# Patient Record
Sex: MALE | Race: WHITE
[De-identification: names, ages, dates, MRNs, and addresses within clinical notes are randomized per-mention and may not be internally consistent; named-entity substitution may affect disease eponyms.]

---

## 2018-11-13 PROBLEM — Z00.00 ENCOUNTER FOR PREVENTIVE HEALTH EXAMINATION: Status: ACTIVE | Noted: 2018-11-13

## 2018-11-27 ENCOUNTER — APPOINTMENT (OUTPATIENT)
Dept: PLASTIC SURGERY | Facility: CLINIC | Age: 48
End: 2018-11-27
Payer: COMMERCIAL

## 2018-11-27 VITALS — WEIGHT: 180 LBS | HEIGHT: 67 IN | BODY MASS INDEX: 28.25 KG/M2

## 2018-11-27 DIAGNOSIS — Z72.3 LACK OF PHYSICAL EXERCISE: ICD-10-CM

## 2018-11-27 DIAGNOSIS — Z87.891 PERSONAL HISTORY OF NICOTINE DEPENDENCE: ICD-10-CM

## 2018-11-27 PROCEDURE — 99203 OFFICE O/P NEW LOW 30 MIN: CPT

## 2018-11-27 RX ORDER — OMEPRAZOLE 20 MG/1
20 CAPSULE, DELAYED RELEASE ORAL
Refills: 0 | Status: ACTIVE | COMMUNITY

## 2018-12-27 ENCOUNTER — APPOINTMENT (OUTPATIENT)
Dept: PLASTIC SURGERY | Facility: CLINIC | Age: 48
End: 2018-12-27
Payer: COMMERCIAL

## 2018-12-27 ENCOUNTER — MESSAGE (OUTPATIENT)
Age: 48
End: 2018-12-27

## 2018-12-27 PROCEDURE — 99212 OFFICE O/P EST SF 10 MIN: CPT

## 2019-01-10 ENCOUNTER — APPOINTMENT (OUTPATIENT)
Dept: PLASTIC SURGERY | Facility: CLINIC | Age: 49
End: 2019-01-10
Payer: COMMERCIAL

## 2019-01-10 DIAGNOSIS — D48.5 NEOPLASM OF UNCERTAIN BEHAVIOR OF SKIN: ICD-10-CM

## 2019-01-10 PROCEDURE — 99212 OFFICE O/P EST SF 10 MIN: CPT

## 2019-01-10 NOTE — ASSESSMENT
[FreeTextEntry1] : 49 yo M POD#14 s/p excision of left anterior chest skin lesion. Pathology pending. Doing well. \par \par - sutures removed, steri strips applied\par - daily Aquaphor\par - awaiting pathology- will call patient \par - activity restrictions discussed\par - sunblock and scar management discussed\par - f/u PRN

## 2019-01-10 NOTE — PHYSICAL EXAM
[de-identified] : well-appearing pleasant male, no distress [de-identified] : NC/AT [de-identified] : PERRL [de-identified] : supple [de-identified] : ALISSAR [de-identified] : left upper chest incision healing nicely, clean, dry and intact, no cellulitis, no palpable fluid collection  [de-identified] : soft, nontender

## 2019-01-10 NOTE — HISTORY OF PRESENT ILLNESS
[FreeTextEntry1] : 49 y/o M with no significant PMH who presents for evaluation of cystic lesion to left anterior chest x6 years, getting larger and smaller over time. Denies hx of cysts or lipomas. Jeana personal or family hx of Skin CA.\par \par Interval history (1/10/19). Patient presents today POD#14 s/p excision of left anterior chest/medial clavicle skin lesion. Doing well. Offers no significant complaint of pain, fever, chills or bleeding.

## 2019-01-24 ENCOUNTER — APPOINTMENT (OUTPATIENT)
Dept: VASCULAR SURGERY | Facility: CLINIC | Age: 49
End: 2019-01-24
Payer: COMMERCIAL

## 2019-01-24 VITALS — BODY MASS INDEX: 29.03 KG/M2 | WEIGHT: 185 LBS | HEIGHT: 67 IN

## 2019-01-24 DIAGNOSIS — R22.2 LOCALIZED SWELLING, MASS AND LUMP, TRUNK: ICD-10-CM

## 2019-01-24 PROCEDURE — 99203 OFFICE O/P NEW LOW 30 MIN: CPT

## 2019-01-24 NOTE — ASSESSMENT
[FreeTextEntry1] : 49 y/o gentleman with a cystic lesion to left anterior upper chest wall above the left clavicle on the medial aspect for 6- 7 years, noticed that it was waxing and waning in size, got larger with exertion or bending over and smaller if he was laying flat. was seen by Plastic surgery and excision attempted but was found to have varicosities in the region and resection was not done. He had a CT angiogram, which was a normal study. I will review the CT images and will be in touch with him regarding the CT findings and treatment plan.

## 2019-01-24 NOTE — HISTORY OF PRESENT ILLNESS
[FreeTextEntry1] : 49 y/o gentleman with a cystic lesion to left anterior upper chest wall above the left clavicle on the medial aspect for 6- 7 years, noticed that it was waxing and waning in size, got larger with exertion or bending over and smaller if he was laying flat. was seen by Plastic surgery and excision attempted but was found to have varicosities in the region and resection was not done. He had a CT angiogram, which was a normal study.